# Patient Record
Sex: FEMALE | Race: BLACK OR AFRICAN AMERICAN | NOT HISPANIC OR LATINO
[De-identification: names, ages, dates, MRNs, and addresses within clinical notes are randomized per-mention and may not be internally consistent; named-entity substitution may affect disease eponyms.]

---

## 2017-03-07 ENCOUNTER — RESULT REVIEW (OUTPATIENT)
Age: 27
End: 2017-03-07

## 2017-09-05 ENCOUNTER — RESULT REVIEW (OUTPATIENT)
Age: 27
End: 2017-09-05

## 2018-06-13 ENCOUNTER — RESULT REVIEW (OUTPATIENT)
Age: 28
End: 2018-06-13

## 2019-07-29 ENCOUNTER — RESULT REVIEW (OUTPATIENT)
Age: 29
End: 2019-07-29

## 2020-05-28 ENCOUNTER — EMERGENCY (EMERGENCY)
Facility: HOSPITAL | Age: 30
LOS: 1 days | Discharge: HOME | End: 2020-05-28
Attending: STUDENT IN AN ORGANIZED HEALTH CARE EDUCATION/TRAINING PROGRAM
Payer: SELF-PAY

## 2020-05-28 VITALS
HEART RATE: 126 BPM | RESPIRATION RATE: 17 BRPM | TEMPERATURE: 98 F | WEIGHT: 216.05 LBS | HEIGHT: 65 IN | SYSTOLIC BLOOD PRESSURE: 132 MMHG | DIASTOLIC BLOOD PRESSURE: 80 MMHG | OXYGEN SATURATION: 100 %

## 2020-05-28 VITALS — HEART RATE: 97 BPM

## 2020-05-28 LAB
ALBUMIN SERPL ELPH-MCNC: 4.3 G/DL — SIGNIFICANT CHANGE UP (ref 3.3–5.2)
ALP SERPL-CCNC: 66 U/L — SIGNIFICANT CHANGE UP (ref 40–120)
ALT FLD-CCNC: 12 U/L — SIGNIFICANT CHANGE UP
ANION GAP SERPL CALC-SCNC: 14 MMOL/L — SIGNIFICANT CHANGE UP (ref 5–17)
AST SERPL-CCNC: 22 U/L — SIGNIFICANT CHANGE UP
BASOPHILS # BLD AUTO: 0.06 K/UL — SIGNIFICANT CHANGE UP (ref 0–0.2)
BASOPHILS NFR BLD AUTO: 0.8 % — SIGNIFICANT CHANGE UP (ref 0–2)
BILIRUB SERPL-MCNC: 0.3 MG/DL — LOW (ref 0.4–2)
BUN SERPL-MCNC: 10 MG/DL — SIGNIFICANT CHANGE UP (ref 8–20)
CALCIUM SERPL-MCNC: 9.2 MG/DL — SIGNIFICANT CHANGE UP (ref 8.6–10.2)
CHLORIDE SERPL-SCNC: 101 MMOL/L — SIGNIFICANT CHANGE UP (ref 98–107)
CO2 SERPL-SCNC: 24 MMOL/L — SIGNIFICANT CHANGE UP (ref 22–29)
CREAT SERPL-MCNC: 0.76 MG/DL — SIGNIFICANT CHANGE UP (ref 0.5–1.3)
EOSINOPHIL # BLD AUTO: 0.07 K/UL — SIGNIFICANT CHANGE UP (ref 0–0.5)
EOSINOPHIL NFR BLD AUTO: 0.9 % — SIGNIFICANT CHANGE UP (ref 0–6)
GLUCOSE SERPL-MCNC: 95 MG/DL — SIGNIFICANT CHANGE UP (ref 70–99)
HCG SERPL-ACNC: <4 MIU/ML — SIGNIFICANT CHANGE UP
HCT VFR BLD CALC: 36.9 % — SIGNIFICANT CHANGE UP (ref 34.5–45)
HGB BLD-MCNC: 11.8 G/DL — SIGNIFICANT CHANGE UP (ref 11.5–15.5)
IMM GRANULOCYTES NFR BLD AUTO: 0.1 % — SIGNIFICANT CHANGE UP (ref 0–1.5)
LYMPHOCYTES # BLD AUTO: 1.76 K/UL — SIGNIFICANT CHANGE UP (ref 1–3.3)
LYMPHOCYTES # BLD AUTO: 23.1 % — SIGNIFICANT CHANGE UP (ref 13–44)
MCHC RBC-ENTMCNC: 28 PG — SIGNIFICANT CHANGE UP (ref 27–34)
MCHC RBC-ENTMCNC: 32 GM/DL — SIGNIFICANT CHANGE UP (ref 32–36)
MCV RBC AUTO: 87.4 FL — SIGNIFICANT CHANGE UP (ref 80–100)
MONOCYTES # BLD AUTO: 0.53 K/UL — SIGNIFICANT CHANGE UP (ref 0–0.9)
MONOCYTES NFR BLD AUTO: 7 % — SIGNIFICANT CHANGE UP (ref 2–14)
NEUTROPHILS # BLD AUTO: 5.18 K/UL — SIGNIFICANT CHANGE UP (ref 1.8–7.4)
NEUTROPHILS NFR BLD AUTO: 68.1 % — SIGNIFICANT CHANGE UP (ref 43–77)
PLATELET # BLD AUTO: 274 K/UL — SIGNIFICANT CHANGE UP (ref 150–400)
POTASSIUM SERPL-MCNC: 3.7 MMOL/L — SIGNIFICANT CHANGE UP (ref 3.5–5.3)
POTASSIUM SERPL-SCNC: 3.7 MMOL/L — SIGNIFICANT CHANGE UP (ref 3.5–5.3)
PROT SERPL-MCNC: 7 G/DL — SIGNIFICANT CHANGE UP (ref 6.6–8.7)
RBC # BLD: 4.22 M/UL — SIGNIFICANT CHANGE UP (ref 3.8–5.2)
RBC # FLD: 12.7 % — SIGNIFICANT CHANGE UP (ref 10.3–14.5)
SODIUM SERPL-SCNC: 139 MMOL/L — SIGNIFICANT CHANGE UP (ref 135–145)
T4 AB SER-ACNC: 6.6 UG/DL — SIGNIFICANT CHANGE UP (ref 4.5–12)
TSH SERPL-MCNC: 1.14 UIU/ML — SIGNIFICANT CHANGE UP (ref 0.27–4.2)
WBC # BLD: 7.61 K/UL — SIGNIFICANT CHANGE UP (ref 3.8–10.5)
WBC # FLD AUTO: 7.61 K/UL — SIGNIFICANT CHANGE UP (ref 3.8–10.5)

## 2020-05-28 PROCEDURE — 80053 COMPREHEN METABOLIC PANEL: CPT

## 2020-05-28 PROCEDURE — 99285 EMERGENCY DEPT VISIT HI MDM: CPT

## 2020-05-28 PROCEDURE — 84436 ASSAY OF TOTAL THYROXINE: CPT

## 2020-05-28 PROCEDURE — 36415 COLL VENOUS BLD VENIPUNCTURE: CPT

## 2020-05-28 PROCEDURE — 70450 CT HEAD/BRAIN W/O DYE: CPT | Mod: 26

## 2020-05-28 PROCEDURE — 84702 CHORIONIC GONADOTROPIN TEST: CPT

## 2020-05-28 PROCEDURE — 96374 THER/PROPH/DIAG INJ IV PUSH: CPT

## 2020-05-28 PROCEDURE — 84443 ASSAY THYROID STIM HORMONE: CPT

## 2020-05-28 PROCEDURE — 82962 GLUCOSE BLOOD TEST: CPT

## 2020-05-28 PROCEDURE — 85027 COMPLETE CBC AUTOMATED: CPT

## 2020-05-28 PROCEDURE — 70450 CT HEAD/BRAIN W/O DYE: CPT

## 2020-05-28 PROCEDURE — 99284 EMERGENCY DEPT VISIT MOD MDM: CPT | Mod: 25

## 2020-05-28 RX ORDER — ACETAZOLAMIDE 250 MG/1
1 TABLET ORAL
Qty: 28 | Refills: 0
Start: 2020-05-28 | End: 2020-06-10

## 2020-05-28 RX ORDER — SODIUM CHLORIDE 9 MG/ML
1000 INJECTION INTRAMUSCULAR; INTRAVENOUS; SUBCUTANEOUS ONCE
Refills: 0 | Status: COMPLETED | OUTPATIENT
Start: 2020-05-28 | End: 2020-05-28

## 2020-05-28 RX ORDER — ONDANSETRON 8 MG/1
4 TABLET, FILM COATED ORAL ONCE
Refills: 0 | Status: COMPLETED | OUTPATIENT
Start: 2020-05-28 | End: 2020-05-28

## 2020-05-28 RX ADMIN — ONDANSETRON 4 MILLIGRAM(S): 8 TABLET, FILM COATED ORAL at 13:02

## 2020-05-28 RX ADMIN — SODIUM CHLORIDE 1000 MILLILITER(S): 9 INJECTION INTRAMUSCULAR; INTRAVENOUS; SUBCUTANEOUS at 13:03

## 2020-05-28 NOTE — ED STATDOCS - CARE PROVIDERS DIRECT ADDRESSES
,[email or web address]

## 2020-05-28 NOTE — ED STATDOCS - ATTENDING CONTRIBUTION TO CARE
I performed a face to face history and physical exam of the patient and discussed their management with the resident/ACP. I reviewed the resident/ACP's note and agree with the documented findings and plan of care.    labs and imaging reviewed.  CT concerning for possible pseudotumor but symptoms have completely resolved.  I had lengthy discussion with patient regarding potential diagnosis.  Pt offered LP or treatment with outpatient f/up with neurologist if she really did not want LP. Pt understands that this could make it difficult to further diagnose pseudotumor in the future but did not want to have a LP and preferred treatment and outpatient f/up.  Pt instructed to return immediately for worsening headache, recurrent vision changes, or any other concerns. I performed a face to face history and physical exam of the patient and discussed their management with the resident/ACP. I reviewed the resident/ACP's note and agree with the documented findings and plan of care.    labs and imaging reviewed.  CT concerning for possible pseudotumor but symptoms have completely resolved.  I had lengthy discussion with patient regarding potential diagnosis.  Pt offered LP or treatment with outpatient f/up with neurologist if she really did not want LP. Pt understands that this could make it difficult to further diagnose pseudotumor in the future but did not want to have a LP and preferred treatment and outpatient f/up.  Pt instructed to return immediately for worsening headache, recurrent vision changes, or any other concerns.  Pt understands the importance for prompt neurology/ophthalmology outpatient f/up and need for compliance with medications as this can result in blindness if untreated.

## 2020-05-28 NOTE — ED ADULT NURSE NOTE - OBJECTIVE STATEMENT
Patient A&Ox4 complaining of nausea, blurry vision & seeing "halo's". Stated drank alcohol excessively last night to celebrate her birthday. Denies any dizziness. Stated vision is returning to normal.

## 2020-05-28 NOTE — ED ADULT TRIAGE NOTE - CHIEF COMPLAINT QUOTE
Pt states she was drinking alcohol last night and vomited, woke up this morning and vomited again.  When she took her prescription colored contacts out this at 1000 she had blurry vision in both eyes and began seeing halos around light fixtures.  Pt states she still has blurry vision even with glasses on at this time.  Denies headache or dizziness.   in triage, pt states she has chronic fast heart rate. Pt states she was drinking alcohol last night and vomited, woke up this morning and vomited again.  When she took her prescription colored contacts out this morning at 1000 she had blurry vision in both eyes and began seeing halos around light fixtures.  Pt states she still has blurry vision even with glasses on at this time.  Denies headache or dizziness.   in triage, pt states she has chronic fast heart rate.

## 2020-05-28 NOTE — ED STATDOCS - PHYSICAL EXAMINATION
Constitutional - well-developed; well nourished. Head - NCAT. Airway patent. Eyes - PERRL. CV - RRR. no murmur. no edema. Pulm - CTAB. Abd - soft, nt. no rebound. no guarding. Neuro - A&Ox3. strength 5/5 x4. sensation intact x4. normal gait. Cranial nerves II-XII intact. Skin - No rash. MSK - normal ROM.

## 2020-05-28 NOTE — ED ADULT NURSE NOTE - NSIMPLEMENTINTERV_GEN_ALL_ED
Implemented All Universal Safety Interventions:  [place] to call system. Call bell, personal items and telephone within reach. Instruct patient to call for assistance. Room bathroom lighting operational. Non-slip footwear when patient is off stretcher. Physically safe environment: no spills, clutter or unnecessary equipment. Stretcher in lowest position, wheels locked, appropriate side rails in place.

## 2020-05-28 NOTE — ED STATDOCS - PROVIDER TOKENS
PROVIDER:[TOKEN:[[id number]],FOLLOWUP:[1-3 Days]],PROVIDER:[TOKEN:[[id number]],FOLLOWUP:[1-3 Days]]

## 2020-05-28 NOTE — ED ADULT TRIAGE NOTE - NS ED TRIAGE AVPU SCALE
Alert-The patient is alert, awake and responds to voice. The patient is oriented to time, place, and person. The triage nurse is able to obtain subjective information. SOB (shortness of breath)

## 2020-05-28 NOTE — ED STATDOCS - PATIENT PORTAL LINK FT
You can access the FollowMyHealth Patient Portal offered by [facility] by registering at the following website: [email or web address]. By joining [employer]’s FollowMyHealth portal, you will also be able to view your health information using other applications (apps) compatible with our system.

## 2020-05-28 NOTE — ED STATDOCS - NS ED ROS FT
No fever/chills, No photophobia/eye pain,+ changes in vision, No ear pain/sore throat/dysphagia, No chest pain/palpitations, no SOB/cough/wheeze/stridor, No abdominal pain, No N/V/D, no dysuria/frequency/discharge, No neck/back pain, no rash, no changes in neurological status/function.

## 2020-05-28 NOTE — ED STATDOCS - CARE PROVIDER_API CALL
Zac Ram  NEUROLOGY  [address] [place] [address]  Phone: [phone]  Fax: [phone]  Follow Up Time: 1-3 Days    Murray Zepeda  OPHTHALMOLOGY  [address] [facility] [address]  [place] [address]  Phone: [phone]  Fax: [phone]  Follow Up Time: 1-3 Days

## 2020-05-28 NOTE — ED STATDOCS - OBJECTIVE STATEMENT
29 y/o F pt presents to ED c/o blurry vision. Patient admits to drinking ETOH last night, woke up this AM and vomited twice. At 10:00 after putting in her colored contacts, noticed that she had blurry vision and seeing halos around light fixtures. Denies numbness or weakness in extremities, slurred speech or difficult ambulating. Denies ABD pain. Denies fever, CP, SOB. No further complaints at this time.

## 2020-05-28 NOTE — ED ADULT NURSE NOTE - CHIEF COMPLAINT QUOTE
Pt states she was drinking alcohol last night and vomited, woke up this morning and vomited again.  When she took her prescription colored contacts out this morning at 1000 she had blurry vision in both eyes and began seeing halos around light fixtures.  Pt states she still has blurry vision even with glasses on at this time.  Denies headache or dizziness.   in triage, pt states she has chronic fast heart rate.

## 2020-05-28 NOTE — ED STATDOCS - PROGRESS NOTE DETAILS
pt appears comfortable, states her vision is completely back to normal but has still a little bit of nausea. States she drank ETOH last night and was mixing different types of drinks. states she tends to have increased resting HR and has social anxiety which she takes propanolol PRN. will follow intake dr plan and reassess.

## 2021-05-23 ENCOUNTER — TRANSCRIPTION ENCOUNTER (OUTPATIENT)
Age: 31
End: 2021-05-23

## 2024-02-22 ENCOUNTER — OFFICE (OUTPATIENT)
Facility: LOCATION | Age: 34
Setting detail: OPHTHALMOLOGY
End: 2024-02-22
Payer: COMMERCIAL

## 2024-02-22 DIAGNOSIS — H52.13: ICD-10-CM

## 2024-02-22 DIAGNOSIS — G93.2: ICD-10-CM

## 2024-02-22 DIAGNOSIS — H53.433: ICD-10-CM

## 2024-02-22 DIAGNOSIS — H04.123: ICD-10-CM

## 2024-02-22 DIAGNOSIS — H25.13: ICD-10-CM

## 2024-02-22 PROCEDURE — 92083 EXTENDED VISUAL FIELD XM: CPT | Performed by: OPHTHALMOLOGY

## 2024-02-22 PROCEDURE — 92133 CPTRZD OPH DX IMG PST SGM ON: CPT | Performed by: OPHTHALMOLOGY

## 2024-02-22 PROCEDURE — 92014 COMPRE OPH EXAM EST PT 1/>: CPT | Performed by: OPHTHALMOLOGY

## 2024-02-22 PROCEDURE — 92015 DETERMINE REFRACTIVE STATE: CPT | Performed by: OPHTHALMOLOGY

## 2024-02-22 ASSESSMENT — SPHEQUIV_DERIVED
OS_SPHEQUIV: -3.25
OD_SPHEQUIV: -2.75
OS_SPHEQUIV: -3.25
OS_SPHEQUIV: -3.5
OS_SPHEQUIV: -3.5
OD_SPHEQUIV: -2.75

## 2024-02-22 ASSESSMENT — REFRACTION_AUTOREFRACTION
OS_SPHERE: -3.00
OD_SPHERE: -2.50
OD_AXIS: 172
OS_CYLINDER: -1.00
OS_AXIS: 003
OD_CYLINDER: -0.50

## 2024-02-22 ASSESSMENT — REFRACTION_MANIFEST
OD_AXIS: 165
OS_SPHERE: -2.75
OD_SPHERE: -2.50
OS_CYLINDER: -1.00
OS_SPHERE: -3.00
OD_CYLINDER: -0.50
OS_VA1: 20/20
OD_SPHERE: -2.50
OD_VA1: 20/20
OD_AXIS: 165
OS_CYLINDER: -1.00
OD_CYLINDER: -0.50
OD_CYLINDER: -0.50
OD_AXIS: 010
OS_AXIS: 180
OS_VA1: 20/20
OS_SPHERE: -2.75
OD_VA1: 20/25
OS_AXIS: 170
OD_SPHERE: -2.50
OS_CYLINDER: -1.00
OD_VA1: 20/25
OS_AXIS: 170
OS_VA1: 20/20

## 2024-02-22 ASSESSMENT — REFRACTION_CURRENTRX
OS_VPRISM_DIRECTION: SV
OS_OVR_VA: 20/
OS_OVR_VA: 20/
OS_VPRISM_DIRECTION: SV
OD_VPRISM_DIRECTION: SV
OD_OVR_VA: 20/
OD_VPRISM_DIRECTION: SV
OD_AXIS: 168
OD_SPHERE: -2.50
OS_AXIS: 170
OD_AXIS: 167
OS_CYLINDER: -1.00
OS_SPHERE: -2.75
OS_CYLINDER: -1.00
OD_CYLINDER: -0.50
OD_CYLINDER: -0.50
OD_SPHERE: -2.25
OS_SPHERE: -2.75
OS_AXIS: 179
OD_OVR_VA: 20/

## 2024-02-22 ASSESSMENT — CONFRONTATIONAL VISUAL FIELD TEST (CVF)
OS_FINDINGS: FULL
OD_FINDINGS: FULL

## 2024-06-27 ENCOUNTER — OFFICE (OUTPATIENT)
Facility: LOCATION | Age: 34
Setting detail: OPHTHALMOLOGY
End: 2024-06-27
Payer: COMMERCIAL

## 2024-06-27 DIAGNOSIS — G93.2: ICD-10-CM

## 2024-06-27 DIAGNOSIS — H53.433: ICD-10-CM

## 2024-06-27 DIAGNOSIS — H04.123: ICD-10-CM

## 2024-06-27 DIAGNOSIS — H52.13: ICD-10-CM

## 2024-06-27 DIAGNOSIS — H25.13: ICD-10-CM

## 2024-06-27 PROCEDURE — 92012 INTRM OPH EXAM EST PATIENT: CPT | Performed by: OPHTHALMOLOGY

## 2024-06-27 PROCEDURE — 92083 EXTENDED VISUAL FIELD XM: CPT | Performed by: OPHTHALMOLOGY

## 2024-06-27 PROCEDURE — 92015 DETERMINE REFRACTIVE STATE: CPT | Performed by: OPHTHALMOLOGY

## 2024-06-27 PROCEDURE — 92133 CPTRZD OPH DX IMG PST SGM ON: CPT | Performed by: OPHTHALMOLOGY

## 2024-06-27 ASSESSMENT — CONFRONTATIONAL VISUAL FIELD TEST (CVF)
OD_FINDINGS: FULL
OS_FINDINGS: FULL

## 2024-08-18 ENCOUNTER — EMERGENCY (EMERGENCY)
Facility: HOSPITAL | Age: 34
LOS: 1 days | Discharge: HOME | End: 2024-08-18
Attending: EMERGENCY MEDICINE
Payer: COMMERCIAL

## 2024-08-18 VITALS
SYSTOLIC BLOOD PRESSURE: 146 MMHG | RESPIRATION RATE: 18 BRPM | TEMPERATURE: 99 F | OXYGEN SATURATION: 100 % | WEIGHT: 229.72 LBS | DIASTOLIC BLOOD PRESSURE: 98 MMHG | HEIGHT: 65 IN | HEART RATE: 115 BPM

## 2024-08-18 LAB
ALBUMIN SERPL ELPH-MCNC: 4 G/DL — SIGNIFICANT CHANGE UP (ref 3.3–5.2)
ALP SERPL-CCNC: 69 U/L — SIGNIFICANT CHANGE UP (ref 40–120)
ALT FLD-CCNC: 12 U/L — SIGNIFICANT CHANGE UP
ANION GAP SERPL CALC-SCNC: 10 MMOL/L — SIGNIFICANT CHANGE UP (ref 5–17)
AST SERPL-CCNC: 16 U/L — SIGNIFICANT CHANGE UP
BASOPHILS # BLD AUTO: 0.05 K/UL — SIGNIFICANT CHANGE UP (ref 0–0.2)
BASOPHILS NFR BLD AUTO: 0.7 % — SIGNIFICANT CHANGE UP (ref 0–2)
BILIRUB SERPL-MCNC: 0.3 MG/DL — LOW (ref 0.4–2)
BUN SERPL-MCNC: 12.8 MG/DL — SIGNIFICANT CHANGE UP (ref 8–20)
CALCIUM SERPL-MCNC: 8.8 MG/DL — SIGNIFICANT CHANGE UP (ref 8.4–10.5)
CHLORIDE SERPL-SCNC: 105 MMOL/L — SIGNIFICANT CHANGE UP (ref 96–108)
CO2 SERPL-SCNC: 22 MMOL/L — SIGNIFICANT CHANGE UP (ref 22–29)
CREAT SERPL-MCNC: 0.77 MG/DL — SIGNIFICANT CHANGE UP (ref 0.5–1.3)
EGFR: 103 ML/MIN/1.73M2 — SIGNIFICANT CHANGE UP
EGFR: 103 ML/MIN/1.73M2 — SIGNIFICANT CHANGE UP
EOSINOPHIL # BLD AUTO: 0.11 K/UL — SIGNIFICANT CHANGE UP (ref 0–0.5)
EOSINOPHIL NFR BLD AUTO: 1.6 % — SIGNIFICANT CHANGE UP (ref 0–6)
GLUCOSE SERPL-MCNC: 102 MG/DL — HIGH (ref 70–99)
HCT VFR BLD CALC: 36.9 % — SIGNIFICANT CHANGE UP (ref 34.5–45)
HGB BLD-MCNC: 11.6 G/DL — SIGNIFICANT CHANGE UP (ref 11.5–15.5)
IMM GRANULOCYTES NFR BLD AUTO: 0.3 % — SIGNIFICANT CHANGE UP (ref 0–0.9)
LYMPHOCYTES # BLD AUTO: 1.55 K/UL — SIGNIFICANT CHANGE UP (ref 1–3.3)
LYMPHOCYTES # BLD AUTO: 22.7 % — SIGNIFICANT CHANGE UP (ref 13–44)
MAGNESIUM SERPL-MCNC: 2 MG/DL — SIGNIFICANT CHANGE UP (ref 1.6–2.6)
MCHC RBC-ENTMCNC: 28.1 PG — SIGNIFICANT CHANGE UP (ref 27–34)
MCHC RBC-ENTMCNC: 31.4 GM/DL — LOW (ref 32–36)
MCV RBC AUTO: 89.3 FL — SIGNIFICANT CHANGE UP (ref 80–100)
MONOCYTES # BLD AUTO: 0.47 K/UL — SIGNIFICANT CHANGE UP (ref 0–0.9)
MONOCYTES NFR BLD AUTO: 6.9 % — SIGNIFICANT CHANGE UP (ref 2–14)
NEUTROPHILS # BLD AUTO: 4.62 K/UL — SIGNIFICANT CHANGE UP (ref 1.8–7.4)
NEUTROPHILS NFR BLD AUTO: 67.8 % — SIGNIFICANT CHANGE UP (ref 43–77)
PLATELET # BLD AUTO: 212 K/UL — SIGNIFICANT CHANGE UP (ref 150–400)
POTASSIUM SERPL-MCNC: 4.3 MMOL/L — SIGNIFICANT CHANGE UP (ref 3.5–5.3)
POTASSIUM SERPL-SCNC: 4.3 MMOL/L — SIGNIFICANT CHANGE UP (ref 3.5–5.3)
PROT SERPL-MCNC: 6.8 G/DL — SIGNIFICANT CHANGE UP (ref 6.6–8.7)
RBC # BLD: 4.13 M/UL — SIGNIFICANT CHANGE UP (ref 3.8–5.2)
RBC # FLD: 13.5 % — SIGNIFICANT CHANGE UP (ref 10.3–14.5)
SODIUM SERPL-SCNC: 137 MMOL/L — SIGNIFICANT CHANGE UP (ref 135–145)
WBC # BLD: 6.82 K/UL — SIGNIFICANT CHANGE UP (ref 3.8–10.5)
WBC # FLD AUTO: 6.82 K/UL — SIGNIFICANT CHANGE UP (ref 3.8–10.5)

## 2024-08-18 PROCEDURE — 85025 COMPLETE CBC W/AUTO DIFF WBC: CPT

## 2024-08-18 PROCEDURE — 99284 EMERGENCY DEPT VISIT MOD MDM: CPT | Mod: 25

## 2024-08-18 PROCEDURE — 36415 COLL VENOUS BLD VENIPUNCTURE: CPT

## 2024-08-18 PROCEDURE — 99284 EMERGENCY DEPT VISIT MOD MDM: CPT

## 2024-08-18 PROCEDURE — 83735 ASSAY OF MAGNESIUM: CPT

## 2024-08-18 PROCEDURE — 93971 EXTREMITY STUDY: CPT | Mod: 26,RT

## 2024-08-18 PROCEDURE — 80053 COMPREHEN METABOLIC PANEL: CPT

## 2024-08-18 PROCEDURE — 93971 EXTREMITY STUDY: CPT

## 2024-08-18 RX ORDER — NAPROXEN SODIUM 275 MG
500 TABLET ORAL ONCE
Refills: 0 | Status: COMPLETED | OUTPATIENT
Start: 2024-08-18 | End: 2024-08-18

## 2024-08-18 RX ADMIN — Medication 500 MILLIGRAM(S): at 10:12

## 2024-08-23 DIAGNOSIS — R51.9 HEADACHE, UNSPECIFIED: ICD-10-CM

## 2024-08-23 DIAGNOSIS — M79.661 PAIN IN RIGHT LOWER LEG: ICD-10-CM

## 2024-08-23 DIAGNOSIS — I80.9 PHLEBITIS AND THROMBOPHLEBITIS OF UNSPECIFIED SITE: ICD-10-CM

## 2024-08-23 DIAGNOSIS — F41.9 ANXIETY DISORDER, UNSPECIFIED: ICD-10-CM

## 2024-11-20 ENCOUNTER — OFFICE (OUTPATIENT)
Dept: URBAN - METROPOLITAN AREA CLINIC 115 | Facility: CLINIC | Age: 34
Setting detail: OPHTHALMOLOGY
End: 2024-11-20
Payer: COMMERCIAL

## 2024-11-20 DIAGNOSIS — G93.2: ICD-10-CM

## 2024-11-20 DIAGNOSIS — H53.433: ICD-10-CM

## 2024-11-20 DIAGNOSIS — H25.13: ICD-10-CM

## 2024-11-20 DIAGNOSIS — H04.123: ICD-10-CM

## 2024-11-20 PROBLEM — H40.013 GLAUCOMA SUSPECT, LOW RISK 1-2 FACTORS; BOTH EYES: Status: ACTIVE | Noted: 2024-11-20

## 2024-11-20 PROCEDURE — 92012 INTRM OPH EXAM EST PATIENT: CPT | Performed by: OPHTHALMOLOGY

## 2024-11-20 PROCEDURE — 92134 CPTRZ OPH DX IMG PST SGM RTA: CPT | Performed by: OPHTHALMOLOGY

## 2024-11-20 PROCEDURE — 92083 EXTENDED VISUAL FIELD XM: CPT | Performed by: OPHTHALMOLOGY

## 2024-11-20 ASSESSMENT — REFRACTION_MANIFEST
OD_AXIS: 155
OS_CYLINDER: -1.00
OD_AXIS: 010
OD_SPHERE: -2.50
OD_AXIS: 165
OS_VA1: 20/20
OS_VA1: 20/25+
OS_CYLINDER: -1.00
OS_VA1: 20/20
OD_VA1: 20/25
OD_VA1: 20/25
OS_CYLINDER: -1.00
OS_SPHERE: -2.75
OD_CYLINDER: -0.50
OS_AXIS: 180
OS_SPHERE: -2.75
OD_AXIS: 165
OS_AXIS: 010
OS_CYLINDER: -1.00
OD_SPHERE: -2.00
OS_SPHERE: -2.50
OS_VA1: 20/20
OD_VA1: 20/25
OS_AXIS: 170
OS_AXIS: 170
OD_SPHERE: -2.50
OD_CYLINDER: -0.50
OS_SPHERE: -3.00
OD_CYLINDER: -0.50
OD_VA1: 20/20
OD_CYLINDER: -0.50
OD_SPHERE: -2.50

## 2024-11-20 ASSESSMENT — REFRACTION_AUTOREFRACTION
OD_CYLINDER: -0.25
OS_SPHERE: -2.75
OS_CYLINDER: -1.25
OD_SPHERE: -2.50
OS_AXIS: 003
OD_AXIS: 152

## 2024-11-20 ASSESSMENT — REFRACTION_CURRENTRX
OS_VPRISM_DIRECTION: SV
OD_SPHERE: -2.25
OD_OVR_VA: 20/
OD_SPHERE: -2.50
OD_AXIS: 167
OD_VPRISM_DIRECTION: SV
OD_CYLINDER: -0.50
OS_SPHERE: -2.75
OS_CYLINDER: -1.00
OS_CYLINDER: -1.00
OS_VPRISM_DIRECTION: SV
OD_AXIS: 168
OS_OVR_VA: 20/
OS_SPHERE: -2.75
OD_OVR_VA: 20/
OD_VPRISM_DIRECTION: SV
OS_OVR_VA: 20/
OS_AXIS: 170
OS_AXIS: 179
OD_CYLINDER: -0.50

## 2024-11-20 ASSESSMENT — KERATOMETRY
OS_AXISANGLE_DEGREES: 085
OD_K2POWER_DIOPTERS: 46.25
OD_K1POWER_DIOPTERS: 45.75
OD_AXISANGLE_DEGREES: 45.
OS_K1POWER_DIOPTERS: 45.75
OS_K2POWER_DIOPTERS: 47.00
METHOD_AUTO_MANUAL: AUTO

## 2024-11-20 ASSESSMENT — CONFRONTATIONAL VISUAL FIELD TEST (CVF)
OD_FINDINGS: FULL
OS_FINDINGS: FULL

## 2024-11-20 ASSESSMENT — VISUAL ACUITY
OD_BCVA: 20/25
OS_BCVA: 20/20

## 2024-11-20 ASSESSMENT — TONOMETRY
OS_IOP_MMHG: 15
OD_IOP_MMHG: 14

## 2025-02-26 ENCOUNTER — NON-APPOINTMENT (OUTPATIENT)
Age: 35
End: 2025-02-26

## 2025-03-20 ENCOUNTER — OFFICE (OUTPATIENT)
Facility: LOCATION | Age: 35
Setting detail: OPHTHALMOLOGY
End: 2025-03-20
Payer: COMMERCIAL

## 2025-03-20 DIAGNOSIS — H53.433: ICD-10-CM

## 2025-03-20 DIAGNOSIS — H40.013: ICD-10-CM

## 2025-03-20 DIAGNOSIS — H04.123: ICD-10-CM

## 2025-03-20 DIAGNOSIS — H25.13: ICD-10-CM

## 2025-03-20 DIAGNOSIS — G93.2: ICD-10-CM

## 2025-03-20 PROCEDURE — 92250 FUNDUS PHOTOGRAPHY W/I&R: CPT | Performed by: OPHTHALMOLOGY

## 2025-03-20 PROCEDURE — 92012 INTRM OPH EXAM EST PATIENT: CPT | Performed by: OPHTHALMOLOGY

## 2025-03-20 PROCEDURE — 92083 EXTENDED VISUAL FIELD XM: CPT | Performed by: OPHTHALMOLOGY

## 2025-03-20 ASSESSMENT — REFRACTION_MANIFEST
OS_CYLINDER: -1.00
OD_AXIS: 155
OS_VA1: 20/20
OS_SPHERE: -2.75
OS_SPHERE: -2.50
OS_AXIS: 180
OS_AXIS: 170
OD_SPHERE: -2.50
OD_CYLINDER: -0.50
OS_CYLINDER: -1.00
OD_CYLINDER: -0.50
OS_VA1: 20/20
OD_AXIS: 165
OD_SPHERE: -2.50
OD_CYLINDER: -0.50
OS_VA1: 20/25+
OS_CYLINDER: -1.00
OS_CYLINDER: -1.00
OS_AXIS: 170
OS_SPHERE: -3.00
OD_SPHERE: -2.50
OD_VA1: 20/20
OD_SPHERE: -2.00
OS_AXIS: 010
OD_VA1: 20/25
OD_VA1: 20/25
OD_AXIS: 010
OD_VA1: 20/25
OD_AXIS: 165
OS_VA1: 20/20
OS_SPHERE: -2.75
OD_CYLINDER: -0.50

## 2025-03-20 ASSESSMENT — REFRACTION_AUTOREFRACTION
OS_SPHERE: -2.50
OS_AXIS: 003
OD_SPHERE: --2.25
OS_CYLINDER: -1.25
OD_AXIS: 166
OD_CYLINDER: -0.75

## 2025-03-20 ASSESSMENT — REFRACTION_CURRENTRX
OS_OVR_VA: 20/
OD_SPHERE: -2.50
OD_SPHERE: -2.25
OD_VPRISM_DIRECTION: SV
OS_SPHERE: -2.75
OS_VPRISM_DIRECTION: SV
OS_CYLINDER: -1.00
OD_CYLINDER: -0.50
OS_VPRISM_DIRECTION: SV
OS_AXIS: 170
OS_CYLINDER: -1.00
OS_OVR_VA: 20/
OD_AXIS: 167
OS_AXIS: 179
OD_CYLINDER: -0.50
OD_OVR_VA: 20/
OD_AXIS: 168
OD_OVR_VA: 20/
OS_SPHERE: -2.75
OD_VPRISM_DIRECTION: SV

## 2025-03-20 ASSESSMENT — KERATOMETRY
OD_K1POWER_DIOPTERS: 46.25
OS_K2POWER_DIOPTERS: 47.25
OD_AXISANGLE_DEGREES: 068
OS_AXISANGLE_DEGREES: 092
OS_K1POWER_DIOPTERS: 46.50
METHOD_AUTO_MANUAL: AUTO
OD_K2POWER_DIOPTERS: 47.00

## 2025-03-20 ASSESSMENT — CONFRONTATIONAL VISUAL FIELD TEST (CVF)
OD_FINDINGS: FULL
OS_FINDINGS: FULL

## 2025-03-20 ASSESSMENT — VISUAL ACUITY
OD_BCVA: 20/25
OS_BCVA: 20/20-2

## 2025-06-12 ENCOUNTER — OFFICE (OUTPATIENT)
Facility: LOCATION | Age: 35
Setting detail: OPHTHALMOLOGY
End: 2025-06-12
Payer: COMMERCIAL

## 2025-06-12 DIAGNOSIS — H04.123: ICD-10-CM

## 2025-06-12 DIAGNOSIS — H53.433: ICD-10-CM

## 2025-06-12 DIAGNOSIS — H40.013: ICD-10-CM

## 2025-06-12 DIAGNOSIS — H25.13: ICD-10-CM

## 2025-06-12 DIAGNOSIS — H50.10: ICD-10-CM

## 2025-06-12 DIAGNOSIS — G93.2: ICD-10-CM

## 2025-06-12 PROCEDURE — 92014 COMPRE OPH EXAM EST PT 1/>: CPT | Performed by: OPHTHALMOLOGY

## 2025-06-12 PROCEDURE — 92083 EXTENDED VISUAL FIELD XM: CPT | Performed by: OPHTHALMOLOGY

## 2025-06-12 PROCEDURE — 92133 CPTRZD OPH DX IMG PST SGM ON: CPT | Performed by: OPHTHALMOLOGY

## 2025-06-12 PROCEDURE — 92060 SENSORIMOTOR EXAMINATION: CPT | Performed by: OPHTHALMOLOGY

## 2025-06-12 ASSESSMENT — KERATOMETRY
OS_K1POWER_DIOPTERS: 46.25
OD_K1POWER_DIOPTERS: 46.25
OD_AXISANGLE_DEGREES: 058
OD_K2POWER_DIOPTERS: 47.00
OS_K2POWER_DIOPTERS: 47.25
OS_AXISANGLE_DEGREES: 090
METHOD_AUTO_MANUAL: AUTO

## 2025-06-12 ASSESSMENT — REFRACTION_AUTOREFRACTION
OS_SPHERE: -2.75
OS_CYLINDER: -1.50
OD_SPHERE: -2.00
OS_AXIS: 002
OD_CYLINDER: -0.75
OD_AXIS: 159

## 2025-06-12 ASSESSMENT — REFRACTION_MANIFEST
OS_CYLINDER: -1.00
OD_SPHERE: -2.00
OD_AXIS: 155
OD_CYLINDER: -0.50
OS_VA1: 20/20
OD_VA1: 20/25
OD_CYLINDER: -0.50
OD_VA1: 20/25
OS_CYLINDER: -1.00
OS_SPHERE: -2.50
OS_AXIS: 170
OD_SPHERE: -2.50
OS_AXIS: 170
OD_VA1: 20/25
OS_SPHERE: -3.00
OD_SPHERE: -2.50
OS_SPHERE: -2.75
OS_SPHERE: -2.75
OD_AXIS: 165
OD_VA1: 20/20
OD_AXIS: 010
OD_AXIS: 165
OS_AXIS: 010
OS_CYLINDER: -1.00
OS_CYLINDER: -1.00
OS_AXIS: 180
OS_VA1: 20/25+
OD_SPHERE: -2.50
OD_CYLINDER: -0.50
OS_VA1: 20/20
OS_VA1: 20/20
OD_CYLINDER: -0.50

## 2025-06-12 ASSESSMENT — REFRACTION_CURRENTRX
OD_SPHERE: -2.50
OD_AXIS: 160
OS_SPHERE: -2.75
OD_AXIS: 167
OS_AXIS: 170
OS_VPRISM_DIRECTION: SV
OS_SPHERE: -2.75
OD_SPHERE: -2.25
OD_OVR_VA: 20/
OS_AXIS: 006
OD_VPRISM_DIRECTION: SV
OS_CYLINDER: -1.00
OS_OVR_VA: 20/
OD_CYLINDER: -0.50
OS_VPRISM_DIRECTION: SV
OD_OVR_VA: 20/
OS_OVR_VA: 20/
OS_CYLINDER: -1.00
OD_CYLINDER: -0.50
OD_VPRISM_DIRECTION: SV

## 2025-06-12 ASSESSMENT — CONFRONTATIONAL VISUAL FIELD TEST (CVF)
OD_FINDINGS: FULL
OS_FINDINGS: FULL

## 2025-06-12 ASSESSMENT — VISUAL ACUITY
OD_BCVA: 20/25+2
OS_BCVA: 20/20-1